# Patient Record
Sex: MALE | Race: WHITE
[De-identification: names, ages, dates, MRNs, and addresses within clinical notes are randomized per-mention and may not be internally consistent; named-entity substitution may affect disease eponyms.]

---

## 2019-04-22 ENCOUNTER — HOSPITAL ENCOUNTER (EMERGENCY)
Dept: HOSPITAL 88 - FSED | Age: 64
Discharge: HOME | End: 2019-04-22
Payer: COMMERCIAL

## 2019-04-22 VITALS — BODY MASS INDEX: 34.54 KG/M2 | WEIGHT: 255 LBS | HEIGHT: 72 IN

## 2019-04-22 DIAGNOSIS — M47.896: ICD-10-CM

## 2019-04-22 DIAGNOSIS — I10: ICD-10-CM

## 2019-04-22 DIAGNOSIS — M54.5: Primary | ICD-10-CM

## 2019-04-22 DIAGNOSIS — E11.65: ICD-10-CM

## 2019-04-22 DIAGNOSIS — R80.9: ICD-10-CM

## 2019-04-22 PROCEDURE — 72100 X-RAY EXAM L-S SPINE 2/3 VWS: CPT

## 2019-04-22 PROCEDURE — 99283 EMERGENCY DEPT VISIT LOW MDM: CPT

## 2019-04-22 NOTE — DIAGNOSTIC IMAGING REPORT
Lumbar spine two views



CPT code: 19828



Indication: 5 days of back pain



Technique: A.P. and lateral views of the lumbar spine obtained without

comparison.



Findings:

There are five non rib bearing vertebral bodies.  Alignment is maintained on

the AP and lateral views.  The transverse processes are intact.  Mild height

loss of all vertebral bodies without wedging..  There is mild disc space

narrowing at L5-S1. Prominent anterior osteophytes from T12 to L4 and small

anterior osteophytes at L5 and S1.    No abnormalities of the sacroiliac

joints.  The sacrum is normal.  The spinous processes are normally aligned. 

There is no evidence of subluxation.   The right iliac wing has a curvilinear

cystic lesion with sclerotic margins measuring 6 x 7 cm. V there is a narrow

zone of transition. The visualized hips are intact. 



The bowel gas pattern is unremarkable.



IMPRESSION:



1.  Diffuse flowing anterior osteophytes suggestive of DISH.  Mild diffuse

height loss of the vertebral bodies consistent with spondylosis. No acute

compression deformity or listhesis.



2. Cyst with sclerotic margins in the right iliac wing suggestive of aneurysmal

bone cyst. Recommend 6-12 month follow-up with pelvis x-rays to confirm

stability.



Signed by: Dr. Raymond Santillan MD on 4/22/2019 1:55 AM